# Patient Record
Sex: MALE | URBAN - METROPOLITAN AREA
[De-identification: names, ages, dates, MRNs, and addresses within clinical notes are randomized per-mention and may not be internally consistent; named-entity substitution may affect disease eponyms.]

---

## 2022-10-22 ENCOUNTER — OFFICE VISIT (OUTPATIENT)
Dept: URBAN - METROPOLITAN AREA CLINIC 22 | Facility: CLINIC | Age: 62
End: 2022-10-22
Payer: COMMERCIAL

## 2022-10-22 DIAGNOSIS — H25.13 AGE-RELATED NUCLEAR CATARACT, BILATERAL: ICD-10-CM

## 2022-10-22 DIAGNOSIS — H00.022 HORDEOLUM INTERNUM RIGHT LOWER EYELID: Primary | ICD-10-CM

## 2022-10-22 PROCEDURE — 99203 OFFICE O/P NEW LOW 30 MIN: CPT | Performed by: STUDENT IN AN ORGANIZED HEALTH CARE EDUCATION/TRAINING PROGRAM

## 2022-10-22 RX ORDER — NEOMYCIN SULFATE, POLYMYXIN B SULFATE AND DEXAMETHASONE 3.5; 10000; 1 MG/G; [USP'U]/G; MG/G
OINTMENT OPHTHALMIC
Qty: 3.5 | Refills: 1 | Status: ACTIVE
Start: 2022-10-22

## 2022-10-22 ASSESSMENT — INTRAOCULAR PRESSURE
OS: 11
OD: 9

## 2022-10-22 NOTE — IMPRESSION/PLAN
Impression: Age-related nuclear cataract, bilateral: H25.13. Plan: Patient deferred DFE today, will return another day w/ . Discussed importance of DFE. RTC next available DFE.

## 2022-10-22 NOTE — IMPRESSION/PLAN
Impression: Hordeolum internum right lower eyelid: H00.022. Plan: Discussed findings. Rx warm compresses w/ mask x 10 min QID, Maxitrol regino TID x 14 days. Contact clinic sooner if symptoms persist/worsen.